# Patient Record
Sex: FEMALE | Race: WHITE | NOT HISPANIC OR LATINO | Employment: OTHER | URBAN - METROPOLITAN AREA
[De-identification: names, ages, dates, MRNs, and addresses within clinical notes are randomized per-mention and may not be internally consistent; named-entity substitution may affect disease eponyms.]

---

## 2021-06-19 ENCOUNTER — HOSPITAL ENCOUNTER (EMERGENCY)
Facility: CLINIC | Age: 36
Discharge: HOME | End: 2021-06-19
Attending: EMERGENCY MEDICINE | Admitting: EMERGENCY MEDICINE
Payer: OTHER GOVERNMENT

## 2021-06-19 VITALS
SYSTOLIC BLOOD PRESSURE: 143 MMHG | DIASTOLIC BLOOD PRESSURE: 85 MMHG | RESPIRATION RATE: 18 BRPM | OXYGEN SATURATION: 99 % | TEMPERATURE: 97.7 F | HEART RATE: 87 BPM

## 2021-06-19 DIAGNOSIS — Z20.822 PERSON UNDER INVESTIGATION FOR COVID-19: ICD-10-CM

## 2021-06-19 DIAGNOSIS — J06.9 UPPER RESPIRATORY TRACT INFECTION, UNSPECIFIED TYPE: ICD-10-CM

## 2021-06-19 DIAGNOSIS — B34.9 VIRAL SYNDROME: ICD-10-CM

## 2021-06-19 LAB
LABORATORY COMMENT REPORT: NORMAL
SARS-COV-2 RNA RESP QL NAA+PROBE: NEGATIVE
SPECIMEN SOURCE: NORMAL

## 2021-06-19 PROCEDURE — C9803 HOPD COVID-19 SPEC COLLECT: HCPCS | Performed by: EMERGENCY MEDICINE

## 2021-06-19 PROCEDURE — 87635 SARS-COV-2 COVID-19 AMP PRB: CPT | Performed by: FAMILY MEDICINE

## 2021-06-19 PROCEDURE — 99284 EMERGENCY DEPT VISIT MOD MDM: CPT | Performed by: EMERGENCY MEDICINE

## 2021-06-19 PROCEDURE — 96372 THER/PROPH/DIAG INJ SC/IM: CPT | Performed by: EMERGENCY MEDICINE

## 2021-06-19 PROCEDURE — 250N000011 HC RX IP 250 OP 636: Performed by: EMERGENCY MEDICINE

## 2021-06-19 RX ORDER — SULFAMETHOXAZOLE/TRIMETHOPRIM 800-160 MG
1 TABLET ORAL 2 TIMES DAILY
Qty: 14 TABLET | Refills: 0 | Status: SHIPPED | OUTPATIENT
Start: 2021-06-19 | End: 2021-06-26

## 2021-06-19 RX ORDER — DEXAMETHASONE SODIUM PHOSPHATE 10 MG/ML
10 INJECTION, SOLUTION INTRAMUSCULAR; INTRAVENOUS ONCE
Status: COMPLETED | OUTPATIENT
Start: 2021-06-19 | End: 2021-06-19

## 2021-06-19 RX ADMIN — DEXAMETHASONE SODIUM PHOSPHATE 10 MG: 10 INJECTION, SOLUTION INTRAMUSCULAR; INTRAVENOUS at 12:16

## 2021-06-19 NOTE — LETTER
June 19, 2021      To Whom It May Concern:      Deana Breaux was seen in our Emergency Department today, 06/19/21.  I expect her condition to improve over the next 1-2 days.  She may return to work when improved.    Sincerely,        Kae Peñaloza MD

## 2021-06-19 NOTE — ED TRIAGE NOTES
Presents to ED with congestion and sinus pressure. Tested negative for COVID Tuesday via saliva. Patient has since lost most of sense of taste.

## 2021-06-19 NOTE — DISCHARGE INSTRUCTIONS
Drink plenty fluids and get lots of rest.    You are given a steroid medication, dexamethasone, that should start to improve your symptoms over the next few hours and last in your system for the next few days.    Continue your over-the-counter medications as needed.    If you are not improving over the next couple of days, start antibiotics as prescribed. They can cause sun sensitivity so please use heavy sunscreen or sun coverage if you start them. I also recommend taking probiotics such as Culturelle or Florastor or eating yogurt or drinking kombucha to replenish your gut bacteria if you start the antibiotics.    Your Covid test was negative!!    Follow-up in clinic if not improving over the week and return for significant worsening, changes or concerns.    I hope you start to feel much better quickly!!

## 2021-06-20 NOTE — ED PROVIDER NOTES
History     Chief Complaint   Patient presents with     Covid Concern     HPI  History per patient    This is a 35-year-old female, basically healthy, presenting with Covid concern.  Patient has been having URI and flulike symptoms for the last 5 days.  She has had body aches, intermittent fevers, nausea, occasional diarrhea, fatigue, body aches.  She has sinus congestion, rhinorrhea, occasional cough.  She has fullness in her right ear.  She has had loss of taste this morning.  Her  has had URI symptoms for over a week and her son was diagnosed with croup about a week ago.  She had a Covid test 2 days ago, saliva test, that was negative.  Her son was tested for Covid last week and was negative.  She has not had a positive Covid test in the past and she is not immunized from Covid.  She does smoke but has not smoked for 3 days.  She has some chronic allergies and takes Singulair and Zyrtec daily.  She has been trying Flonase, NyQuil, DayQuil without relief.  She also had some amoxicillin that she is tried for the last 3 days.  She notes significant fatigue.  She is not diabetic.  No history of lung disease except for sinus infections about twice per year.  She recently moved here from [place].  She notes that her provider there would give her a shot of steroid and she would note improvement.  She currently notes some maxillary sinus tenderness and some inflammation under her right eye.    Allergies:  No Known Allergies    Problem List:    There are no active problems to display for this patient.       Past Medical History:    No past medical history on file.    Past Surgical History:    No past surgical history on file.    Family History:    No family history on file.    Social History:  Marital Status:   [2]  Social History     Tobacco Use     Smoking status: Not on file   Substance Use Topics     Alcohol use: Not on file     Drug use: Not on file        Medications:    No current  facility-administered medications on file prior to encounter.   No current outpatient medications on file prior to encounter.       Review of Systems   All other ROS reviewed and are negative or non-contributory except as stated in HPI.     Physical Exam   BP: (!) 143/85  Pulse: 87  Temp: 97.7  F (36.5  C)  Resp: 18  SpO2: 99 %      Physical Exam  Vitals signs and nursing note reviewed.   Constitutional:       Comments: Patient is sitting on the bed.  She sounds very congested.  Some sneezing, slight cough, rhinorrhea.   HENT:      Head: Normocephalic.      Comments: Tenderness over the right maxillary sinus     Right Ear: Ear canal normal.      Left Ear: Ear canal normal.      Ears:      Comments: Bilateral TM scarring     Nose: Congestion and rhinorrhea present.      Mouth/Throat:      Mouth: Mucous membranes are moist.      Pharynx: Oropharynx is clear. No oropharyngeal exudate or posterior oropharyngeal erythema.   Eyes:      Extraocular Movements: Extraocular movements intact.      Comments: Mild conjunctival injection on the right with some tearing   Neck:      Musculoskeletal: Normal range of motion and neck supple.   Cardiovascular:      Rate and Rhythm: Normal rate and regular rhythm.      Pulses: Normal pulses.      Heart sounds: Normal heart sounds.   Pulmonary:      Effort: Pulmonary effort is normal.      Breath sounds: Normal breath sounds.   Abdominal:      Palpations: Abdomen is soft.      Tenderness: There is no abdominal tenderness.   Musculoskeletal: Normal range of motion.         General: No tenderness.      Right lower leg: No edema.      Left lower leg: No edema.   Lymphadenopathy:      Cervical: No cervical adenopathy.   Skin:     General: Skin is warm and dry.      Findings: No rash.   Neurological:      General: No focal deficit present.      Mental Status: She is alert and oriented to person, place, and time.   Psychiatric:         Mood and Affect: Mood normal.         Behavior: Behavior  "normal.         ED Course (with Medical Decision Making)    Pt seen and examined by me.  RN and EPIC notes reviewed.       Patient with URI symptoms, some sinus tenderness.  She has had loss of taste but had a negative Covid test 2 days ago.  Is possible this is Covid versus other viral illness especially in light of the fact that her  and son have symptoms.  She has had recurrent sinus infections over the years and had numerous tubes for OM in the past.    I am going to check Covid.  I will give her a dose of Decadron, she prefers IM.  I am also going to give her a \"wait-and-see\" Rx for Bactrim.  If she is not improved over the next couple of days she can start this.  We talked about sun sensitivity.  Continue Flonase, over-the-counter medications as needed.  Drink lots of fluids.  Follow-up in clinic if not improving.  Return for significant worsening, changes or concerns.    Of note, patient's Covid test returned negative prior to leaving the ED.        Procedures    Results for orders placed or performed during the hospital encounter of 06/19/21 (from the past 24 hour(s))   Symptomatic SARS-CoV-2 COVID-19 Virus (Coronavirus) by PCR    Specimen: Nasopharyngeal   Result Value Ref Range    SARS-CoV-2 Virus Specimen Source Nasopharyngeal     SARS-CoV-2 PCR Result NEGATIVE     SARS-CoV-2 PCR Comment (Note)        Medications   dexamethasone PF (DECADRON) injection 10 mg (10 mg Intramuscular Given 6/19/21 1216)       Assessments & Plan      I have reviewed the findings, diagnosis, plan and need for follow up with the patient.    There are no discharge medications for this patient.      Final diagnoses:   Upper respiratory tract infection, unspecified type   Viral syndrome   Person under investigation for COVID-19     Disposition: Patient discharged home in stable condition.  Plan as above.  Return for concerns.     Note: Chart documentation done in part with Dragon Voice Recognition software. Although reviewed " after completion, some word and grammatical errors may remain.     6/19/2021   [facility] EMERGENCY DEPT     Kae Peñaloza MD  06/20/21 0001

## 2021-10-15 ENCOUNTER — HOSPITAL ENCOUNTER (EMERGENCY)
Facility: CLINIC | Age: 36
Discharge: HOME | End: 2021-10-15
Attending: PHYSICIAN ASSISTANT | Admitting: PHYSICIAN ASSISTANT
Payer: MEDICAID

## 2021-10-15 VITALS
OXYGEN SATURATION: 99 % | RESPIRATION RATE: 20 BRPM | DIASTOLIC BLOOD PRESSURE: 81 MMHG | WEIGHT: 177 LBS | TEMPERATURE: 98.7 F | SYSTOLIC BLOOD PRESSURE: 140 MMHG | HEART RATE: 75 BPM

## 2021-10-15 DIAGNOSIS — J06.9 VIRAL URI WITH COUGH: ICD-10-CM

## 2021-10-15 PROCEDURE — 99284 EMERGENCY DEPT VISIT MOD MDM: CPT | Performed by: PHYSICIAN ASSISTANT

## 2021-10-15 PROCEDURE — U0005 INFEC AGEN DETEC AMPLI PROBE: HCPCS | Performed by: PHYSICIAN ASSISTANT

## 2021-10-15 PROCEDURE — 99283 EMERGENCY DEPT VISIT LOW MDM: CPT | Performed by: PHYSICIAN ASSISTANT

## 2021-10-15 PROCEDURE — C9803 HOPD COVID-19 SPEC COLLECT: HCPCS | Performed by: PHYSICIAN ASSISTANT

## 2021-10-15 RX ORDER — DEXTROMETHORPHAN POLISTIREX 30 MG/5ML
60 SUSPENSION ORAL 2 TIMES DAILY PRN
Qty: 148 ML | Refills: 0 | Status: SHIPPED | OUTPATIENT
Start: 2021-10-15 | End: 2024-02-19

## 2021-10-15 RX ORDER — BENZONATATE 200 MG/1
200 CAPSULE ORAL 3 TIMES DAILY PRN
Qty: 21 CAPSULE | Refills: 0 | Status: SHIPPED | OUTPATIENT
Start: 2021-10-15 | End: 2024-02-19

## 2021-10-15 RX ORDER — PREDNISONE 20 MG/1
20 TABLET ORAL 2 TIMES DAILY
Qty: 10 TABLET | Refills: 0 | Status: SHIPPED | OUTPATIENT
Start: 2021-10-15 | End: 2021-10-20

## 2021-10-15 RX ORDER — ALBUTEROL SULFATE 90 UG/1
2 AEROSOL, METERED RESPIRATORY (INHALATION) EVERY 4 HOURS PRN
Qty: 18 G | Refills: 0 | Status: SHIPPED | OUTPATIENT
Start: 2021-10-15

## 2021-10-15 NOTE — ED TRIAGE NOTES
Pt presents with concerns of a cough.  Pt states that she started having a cough yesterday with green phlegm.  Body aches and low grade fevers per pt.  Tylenol at 1600.

## 2021-10-16 LAB — SARS-COV-2 RNA RESP QL NAA+PROBE: NEGATIVE

## 2021-10-16 NOTE — DISCHARGE INSTRUCTIONS
"It was a pleasure working with you today!  I hope your condition improves rapidly!     Thankfully, your vital signs and exam were reassuring.  I am concerned that your body is fighting a virus.  We tested you for COVID-19 to ensure that it is not the COVID-19 virus.  I placed some information below regarding COVID-19 in case this turns positive for you.  Your body's immune system will have to fight this off.  Please make sure that you are drinking increased amounts of fluids.  Please rest.  You can take the Tessalon as needed for cough.  Start the prednisone right away to treat the inflammation in your chest.  Use the albuterol inhaler 2 puffs every 4 hours on a regular basis for the next 3-4 days and then as needed.  In the future, please work on quitting smoking.      Discharge Instructions for COVID-19 Patients  You have--or may have--COVID-19. Please follow the instructions listed below.   If you have a weakened immune system, discuss with your doctor any other actions you need to take.  How can I protect others?  If you have symptoms (fever, cough, body aches or trouble breathing):  Stay home and away from others (self-isolate) until:  Your other symptoms have resolved (gotten better). And   You've had no fever--and no medicine that reduces fever--for 1 full day (24 hours). And   At least 10 days have passed since your symptoms started. (You may need to wait 20 days. Follow the advice of your care team.)  If you don't show symptoms, but testing showed that you have COVID-19:  Stay home and away from others (self-isolate) until at least 10 days have passed since the date of your first positive COVID-19 test.  During this time  Stay in your own room, even for meals. Use your own bathroom if you can.  Stay away from others in your home. No hugging, kissing or shaking hands. No visitors.  Don't go to work, school or anywhere else.  Clean \"high touch\" surfaces often (doorknobs, counters, handles). Use household " cleaning spray or wipes.  You'll find a full list of  on the EPA website: www.epa.gov/pesticide-registration/list-n-disinfectants-use-against-sars-cov-2.  Cover your mouth and nose with a mask or other face covering to avoid spreading germs.  Wash your hands and face often. Use soap and water.  Caregivers in these groups are at risk for severe illness due to COVID-19:  People 65 years and older  People who live in a nursing home or long-term care facility  People with chronic disease (lung, heart, cancer, diabetes, kidney, liver, immunologic)  People who have a weakened immune system, including those who:  Are in cancer treatment  Take medicine that weakens the immune system, such as corticosteroids  Had a bone marrow or organ transplant  Have an immune deficiency  Have poorly controlled HIV or AIDS  Are obese (body mass index of 40 or higher)  Smoke regularly  Caregivers should wear gloves while washing dishes, handling laundry and cleaning bedrooms and bathrooms.  Use caution when washing and drying laundry: Don't shake dirty laundry and use the warmest water setting that you can.  For more tips on managing your health at home, go to www.cdc.gov/coronavirus/2019-ncov/downloads/10Things.pdf.  How can I take care of myself at home?  Get lots of rest. Drink extra fluids (unless a doctor has told you not to).  Take Tylenol (acetaminophen) for fever or pain. If you have liver or kidney problems, ask your family doctor if it's okay to take Tylenol.   Adults can take either:   650 mg (two 325 mg pills) every 4 to 6 hours, or   1,000 mg (two 500 mg pills) every 8 hours as needed.  Note: Don't take more than 3,000 mg in one day. Acetaminophen is found in many medicines (both prescribed and over-the-counter medicines). Read all labels to be sure you don't take too much.   For children, check the Tylenol bottle for the right dose. The dose is based on the child's age or weight.  If you have other health problems  (like cancer, heart failure, an organ transplant or severe kidney disease): Call your specialty clinic if you don't feel better in the next 2 days.  Know when to call 911. Emergency warning signs include:  Trouble breathing or shortness of breath  Pain or pressure in the chest that doesn't go away  Feeling confused like you haven't felt before, or not being able to wake up  Bluish-colored lips or face  Your doctor may have prescribed a blood thinner medicine. Follow their instructions.  Where can I get more information?  [facility] - About COVID-19:   [email or web address]  CDC - What to Do If You're Sick: www.cdc.gov/coronavirus/2019-ncov/about/steps-when-sick.html  CDC - Ending Home Isolation: www.cdc.gov/coronavirus/2019-ncov/hcp/disposition-in-home-patients.html  CDC - Caring for Someone: www.cdc.gov/coronavirus/2019-ncov/if-you-are-sick/care-for-someone.html  [facility] - Interim Guidance for Hospital Discharge to Home: www.health.[facility].mn.us/diseases/coronavirus/hcp/hospdischarge.pdf  Below are the COVID-19 hotlines at the [facility] ([facility]). Interpreters are available.  For health questions: Call 995-133-3626 or 1-123.155.3176 (7 a.m. to 7 p.m.)  For questions about schools and childcare: Call 196-819-2664 or 1-149.548.1817 (7 a.m. to 7 p.m.)    For informational purposes only. Not to replace the advice of your health care provider. Clinically reviewed by Dr. Ashish Craig.   Copyright   2020 [employer]. All rights reserved. [employer] [id number] - REV 01/05/21.

## 2021-10-16 NOTE — ED PROVIDER NOTES
History     Chief Complaint   Patient presents with     Cough     HPI  Deana Breaux is a 36 year old female who presents for evaluation of URI symptoms starting yesterday.  Reports a deep cough which is usually dry but occasionally she will produce green mucus.  Also reports generalized aching headache, myalgias, decreased appetite, low-grade fever that she has not measured, and a few episodes of dizziness.  She had an episode of feeling weak yesterday like she was going to experience syncope, but this is improved over the following few minutes.  Reports receiving the COVID-19 vaccination about 2 months ago.  She received Pfizer.  She has been treating her symptoms with DayQuil, NyQuil, Tylenol, and ibuprofen.  No recent travel history.  No other ill family members.  She does work in retail, and has a lot of contact with the public.  She denies prior history of asthma.  She is a 1/2-1 pack/day smoker.  Has albuterol at home, but states that it is a very old prescription.  She denies ever being formally diagnosed with COPD.        Allergies:  No Known Allergies    Problem List:    There are no problems to display for this patient.       Past Medical History:    No past medical history on file.    Past Surgical History:    No past surgical history on file.    Family History:    No family history on file.    Social History:  Marital Status:   [2]  Social History     Tobacco Use     Smoking status: Not on file   Substance Use Topics     Alcohol use: Not on file     Drug use: Not on file        Medications:    albuterol (PROAIR HFA/PROVENTIL HFA/VENTOLIN HFA) 108 (90 Base) MCG/ACT inhaler  benzonatate (TESSALON) 200 MG capsule  dextromethorphan (DELSYM) 30 MG/5ML liquid  predniSONE (DELTASONE) 20 MG tablet          Review of Systems   All other systems reviewed and are negative.      Physical Exam   BP: (!) 140/81  Pulse: 75  Temp: 98.7  F (37.1  C)  Resp: 20  Weight: 80.3 kg (177 lb)  SpO2: 99  %      Physical Exam  Vitals and nursing note reviewed.   Constitutional:       General: She is not in acute distress.     Appearance: She is not diaphoretic.   HENT:      Head: Normocephalic and atraumatic.      Right Ear: Tympanic membrane, ear canal and external ear normal.      Left Ear: Tympanic membrane, ear canal and external ear normal.      Nose: Nose normal. No congestion or rhinorrhea.      Mouth/Throat:      Mouth: Mucous membranes are moist.      Pharynx: No oropharyngeal exudate.   Eyes:      General: No scleral icterus.        Right eye: No discharge.         Left eye: No discharge.      Conjunctiva/sclera: Conjunctivae normal.      Pupils: Pupils are equal, round, and reactive to light.   Neck:      Thyroid: No thyromegaly.   Cardiovascular:      Rate and Rhythm: Normal rate and regular rhythm.      Heart sounds: Normal heart sounds. No murmur heard.     Pulmonary:      Effort: Pulmonary effort is normal. No respiratory distress.      Breath sounds: Normal breath sounds. No wheezing, rhonchi or rales.   Chest:      Chest wall: No tenderness.   Abdominal:      General: Bowel sounds are normal. There is no distension.      Palpations: Abdomen is soft. There is no mass.      Tenderness: There is no abdominal tenderness. There is no right CVA tenderness, left CVA tenderness, guarding or rebound.   Musculoskeletal:         General: No tenderness or deformity. Normal range of motion.      Cervical back: Normal range of motion and neck supple.   Lymphadenopathy:      Cervical: No cervical adenopathy.   Skin:     General: Skin is warm and dry.      Capillary Refill: Capillary refill takes less than 2 seconds.      Coloration: Skin is not pale.      Findings: No bruising, erythema, lesion or rash.   Neurological:      General: No focal deficit present.      Mental Status: She is alert and oriented to person, place, and time.      Cranial Nerves: No cranial nerve deficit.   Psychiatric:         Mood and  Affect: Mood normal.         Behavior: Behavior normal.         Thought Content: Thought content normal.         ED Course        Procedures              Critical Care time:  none               No results found for this or any previous visit (from the past 24 hour(s)).    Medications - No data to display    Assessments & Plan (with Medical Decision Making)  Viral URI with cough     36 year old female presents for evaluation of URI symptoms for the past 2 days including cough, headache, myalgias, decreased appetite, low-grade fever, and occasional episodes of dizziness.  No change in taste/smell sensation.  She did receive the COVID-19 vaccination.  1/2-1 pack/day smoker.  See HPI for details.  On exam blood pressure 140/81, temperature 98.7, pulse 75, respiration 20, oxygen saturation 99% on room air.  Patient is no acute distress.  Exam is completely reassuring.  See above.  Discussed with the patient that she is likely battling a viral illness.  She was screened for COVID-19 in case that she does have a breakthrough case despite her vaccination status.  Discussed quarantine until results return.  Push clear fluids and increase rest.  Given her long-term smoking history, she could have underlying COPD.  She states that her cough is quite tight.  Therefore, we will boost her with prednisone 20 mg twice daily for 5 days.  She reports that this is helped her in the past.  I also refilled her albuterol to take 2 puffs every 4 hours on a regular basis for the next 3-4 days and then as needed thereafter worse.  Tessalon Perles prescribed as well.  Pharmacy called back and said that Tessalon Perles is not covered under insurance.  Therefore, I prescribed Delsym.  Indications for return reviewed with the patient.  In case she does have COVID-19, I did provide specific COVID-19 discharge instructions.  Patient was in agreement with this plan and was suitable for discharge.     I have reviewed the nursing notes.    I have  reviewed the findings, diagnosis, plan and need for follow up with the patient.       Discharge Medication List as of 10/15/2021  8:07 PM      START taking these medications    Details   albuterol (PROAIR HFA/PROVENTIL HFA/VENTOLIN HFA) 108 (90 Base) MCG/ACT inhaler Inhale 2 puffs into the lungs every 4 hours as needed for shortness of breath / dyspnea, Disp-18 g, R-0, E-PrescribePharmacy may dispense brand covered by insurance (Proair, or proventil or ventolin or generic albuterol inhaler)      benzonatate (TESSALON) 200 MG capsule Take 1 capsule (200 mg) by mouth 3 times daily as needed for cough, Disp-21 capsule, R-0, E-Prescribe      predniSONE (DELTASONE) 20 MG tablet Take 1 tablet (20 mg) by mouth 2 times daily for 5 days, Disp-10 tablet, R-0, E-Prescribe             Final diagnoses:   Viral URI with cough     Disclaimer: This note consists of symbols derived from keyboarding, dictation and/or voice recognition software. As a result, there may be errors in the script that have gone undetected. Please consider this when interpreting information found in this chart.      10/15/2021   [facility] EMERGENCY DEPT     Salty Lott PA-C  10/15/21 2338

## 2022-11-15 ENCOUNTER — HOSPITAL ENCOUNTER (EMERGENCY)
Facility: CLINIC | Age: 37
Discharge: HOME | End: 2022-11-15
Attending: EMERGENCY MEDICINE | Admitting: EMERGENCY MEDICINE
Payer: COMMERCIAL

## 2022-11-15 VITALS
OXYGEN SATURATION: 99 % | RESPIRATION RATE: 18 BRPM | SYSTOLIC BLOOD PRESSURE: 118 MMHG | HEART RATE: 100 BPM | TEMPERATURE: 97.8 F | DIASTOLIC BLOOD PRESSURE: 82 MMHG

## 2022-11-15 DIAGNOSIS — J06.9 UPPER RESPIRATORY TRACT INFECTION, UNSPECIFIED TYPE: ICD-10-CM

## 2022-11-15 LAB
FLUAV RNA SPEC QL NAA+PROBE: NEGATIVE
FLUBV RNA RESP QL NAA+PROBE: NEGATIVE
RSV RNA SPEC NAA+PROBE: NEGATIVE
SARS-COV-2 RNA RESP QL NAA+PROBE: NEGATIVE

## 2022-11-15 PROCEDURE — 99284 EMERGENCY DEPT VISIT MOD MDM: CPT | Mod: CS | Performed by: EMERGENCY MEDICINE

## 2022-11-15 PROCEDURE — 250N000011 HC RX IP 250 OP 636: Performed by: EMERGENCY MEDICINE

## 2022-11-15 PROCEDURE — C9803 HOPD COVID-19 SPEC COLLECT: HCPCS | Performed by: EMERGENCY MEDICINE

## 2022-11-15 PROCEDURE — 87637 SARSCOV2&INF A&B&RSV AMP PRB: CPT | Performed by: FAMILY MEDICINE

## 2022-11-15 PROCEDURE — 96372 THER/PROPH/DIAG INJ SC/IM: CPT | Performed by: EMERGENCY MEDICINE

## 2022-11-15 RX ORDER — FERROUS SULFATE 325(65) MG
325 TABLET, DELAYED RELEASE (ENTERIC COATED) ORAL
COMMUNITY
Start: 2022-07-22 | End: 2023-07-22

## 2022-11-15 RX ORDER — DEXTROAMPHETAMINE SACCHARATE, AMPHETAMINE ASPARTATE, DEXTROAMPHETAMINE SULFATE AND AMPHETAMINE SULFATE 5; 5; 5; 5 MG/1; MG/1; MG/1; MG/1
TABLET ORAL
COMMUNITY
Start: 2022-08-24

## 2022-11-15 RX ORDER — DEXAMETHASONE SODIUM PHOSPHATE 10 MG/ML
10 INJECTION, SOLUTION INTRAMUSCULAR; INTRAVENOUS ONCE
Status: COMPLETED | OUTPATIENT
Start: 2022-11-15 | End: 2022-11-15

## 2022-11-15 RX ORDER — IBUPROFEN 200 MG
800 TABLET ORAL EVERY 8 HOURS PRN
COMMUNITY

## 2022-11-15 RX ADMIN — DEXAMETHASONE SODIUM PHOSPHATE 10 MG: 10 INJECTION, SOLUTION INTRAMUSCULAR; INTRAVENOUS at 18:10

## 2022-11-15 NOTE — ED PROVIDER NOTES
History     Chief Complaint   Patient presents with     Cough     HPI  Deana Breaux is a 37 year old female who presents to the emergency department secondary to upper respiratory infection symptoms.  This started a week and a half ago and has persisted.  No fevers.  She has had some headache and body aches.  The cough is nonproductive.  She is not really short of breath.  She tried an albuterol inhaler which did not help.  No history of asthma or COPD but has had bronchitis in the past.  No vomiting but occasionally has some pain in her abdomen.    Allergies:  No Known Allergies    Problem List:    There are no problems to display for this patient.       Past Medical History:    History reviewed. No pertinent past medical history.    Past Surgical History:    History reviewed. No pertinent surgical history.    Family History:    History reviewed. No pertinent family history.    Social History:  Marital Status:   [2]  Social History     Tobacco Use     Smoking status: Every Day     Types: Cigarettes, Vaping Device     Smokeless tobacco: Never   Substance Use Topics     Alcohol use: Not Currently     Drug use: Never        Medications:    albuterol (PROAIR HFA/PROVENTIL HFA/VENTOLIN HFA) 108 (90 Base) MCG/ACT inhaler  amphetamine-dextroamphetamine (ADDERALL) 20 MG tablet  ferrous sulfate (FE TABS) 325 (65 Fe) MG EC tablet  ibuprofen (ADVIL/MOTRIN) 200 MG tablet  benzonatate (TESSALON) 200 MG capsule  dextromethorphan (DELSYM) 30 MG/5ML liquid          Review of Systems   All other systems reviewed and are negative.      Physical Exam   BP: 118/82  Pulse: 100  Temp: 97.8  F (36.6  C)  Resp: 18  SpO2: 99 %      Physical Exam  Vitals and nursing note reviewed.   Constitutional:       General: She is not in acute distress.     Appearance: Normal appearance. She is well-developed and well-nourished. She is not diaphoretic.   HENT:      Head: Normocephalic and atraumatic.      Right Ear: External ear normal.       Left Ear: External ear normal.      Nose: Congestion present. No rhinorrhea.      Mouth/Throat:      Mouth: Mucous membranes are moist.      Pharynx: No oropharyngeal exudate or posterior oropharyngeal erythema.   Eyes:      General: No scleral icterus.     Extraocular Movements: Extraocular movements intact.      Pupils: Pupils are equal, round, and reactive to light.   Cardiovascular:      Rate and Rhythm: Normal rate.   Pulmonary:      Effort: Pulmonary effort is normal.      Breath sounds: Normal breath sounds. No rales.   Chest:      Chest wall: No tenderness.   Abdominal:      Tenderness: There is no abdominal tenderness. There is no guarding or rebound.   Musculoskeletal:         General: Normal range of motion.      Cervical back: Normal range of motion and neck supple.   Skin:     General: Skin is warm and dry.      Coloration: Skin is not pale.      Findings: No erythema or rash.   Neurological:      General: No focal deficit present.      Mental Status: She is alert and oriented to person, place, and time.   Psychiatric:         Mood and Affect: Mood normal.         Thought Content: Thought content normal.         ED Course                 Procedures                  Results for orders placed or performed during the hospital encounter of 11/15/22 (from the past 24 hour(s))   Symptomatic; Auto-generated order Influenza A/B & SARS-CoV2 (COVID-19) Virus PCR Multiplex Nasopharyngeal    Specimen: Nasopharyngeal; Swab   Result Value Ref Range    Influenza A PCR Negative Negative    Influenza B PCR Negative Negative    RSV PCR Negative Negative    SARS CoV2 PCR Negative Negative    Narrative    Testing was performed using the Xpert Xpress CoV2/Flu/RSV Assay on the [employer] GeneXpert Instrument. This test should be ordered for the detection of SARS-CoV-2 and influenza viruses in individuals who meet clinical and/or epidemiological criteria. Test performance is unknown in asymptomatic patients. This test is  for in vitro diagnostic use under the FDA EUA for laboratories certified under CLIA to perform high or moderate complexity testing. This test has not been FDA cleared or approved. A negative result does not rule out the presence of PCR inhibitors in the specimen or target RNA in concentration below the limit of detection for the assay. If only one viral target is positive but coinfection with multiple targets is suspected, the sample should be re-tested with another FDA cleared, approved, or authorized test, if coinfection would change clinical management. This test was validated by the [facility]. These laboratories are certified under the Clinical Laboratory Improvement Amendments of 1988 (CLIA-88) as qualified to perform high complexity laboratory testing.       Medications   dexamethasone PF (DECADRON) injection 10 mg (has no administration in time range)       Assessments & Plan (with Medical Decision Making)  37-year-old female with upper respite tract infection symptoms and a history of bronchitis.  Vital signs are stable.  Oxygen saturation is normal.  She is in no distress.  No fever.  Influenza COVID and RSV were all negative.  Most likely this is a viral upper respiratory tract infection versus bronchitis.  She wanted to be treated with a shot of steroids and she was given 10 mg of Decadron IM.  This should help with the cough and congestion.  Return to ER precautions and fall precautions discussed.  All questions answered prior to discharge.     I have reviewed the nursing notes.    I have reviewed the findings, diagnosis, plan and need for follow up with the patient.      New Prescriptions    No medications on file       Final diagnoses:   Upper respiratory tract infection, unspecified type       11/15/2022   [facility] EMERGENCY DEPT     Carrillo Root MD  11/15/22 1959

## 2022-11-15 NOTE — ED TRIAGE NOTES
Coughing for over a week, low grade fevers. Hx of bronchitis per pt frequently.      Triage Assessment     Row Name 11/15/22 6127       Triage Assessment (Adult)    Airway WDL WDL       Respiratory WDL    Respiratory WDL X       Cardiac WDL    Cardiac WDL WDL

## 2022-11-15 NOTE — DISCHARGE INSTRUCTIONS
Hopefully the steroid medicine helps your cough.  Your COVID influenza and RSV swabs are negative.  This most likely is a virus.  If you start having fever or increasing shortness of breath return to the emergency department.  You can use your albuterol inhaler as needed.

## 2023-10-17 ENCOUNTER — TRANSCRIBE ORDERS (OUTPATIENT)
Dept: OTHER | Age: 38
End: 2023-10-17

## 2023-10-17 DIAGNOSIS — H65.23 BILATERAL CHRONIC SEROUS OTITIS MEDIA: Primary | ICD-10-CM

## 2024-02-06 NOTE — PROGRESS NOTES
ENT Consultation    Deana Breaux who is a 38 year old female seen in consultation at the request of Lo Zaldivar CNP.      History of Present Illness - Deana Breaux is a 38 year old female presents with chief complaint of recurrent ear infections.  Started about a year ago.  She has had at least 3-4.  Last double ear infection was about 6 weeks ago.  Does not feel that the hearing has been affected.  When she was younger she had tubes 3-4 times but has not had infections since childhood until a year ago.  Denies any history of eczema or psoriasis.  Denies any history of allergies.      BP Readings from Last 1 Encounters:   02/19/24 120/76       BP noted to be well controlled today in office.     Deana IS NOT a smoker/uses chewing tobacco.  Deana already quit      Past Medical History - No past medical history on file.    Current Medications -   Current Outpatient Medications:     amphetamine-dextroamphetamine (ADDERALL) 20 MG tablet, TAKE ONE TABLET BY MOUTH TWICE A DAY AT 7-8 IN THE MORNING AND 2ND DOSE AT 1-2:00 IN THE AFTERNOON 10-23-22, Disp: , Rfl:     ibuprofen (ADVIL/MOTRIN) 200 MG tablet, Take 800 mg by mouth every 8 hours as needed for pain, Disp: , Rfl:     albuterol (PROAIR HFA/PROVENTIL HFA/VENTOLIN HFA) 108 (90 Base) MCG/ACT inhaler, Inhale 2 puffs into the lungs every 4 hours as needed for shortness of breath / dyspnea (Patient not taking: Reported on 2/19/2024), Disp: 18 g, Rfl: 0    Allergies - No Known Allergies    Social History -   Social History     Socioeconomic History    Marital status:    Tobacco Use    Smoking status: Every Day     Types: Cigarettes, Vaping Device    Smokeless tobacco: Never   Substance and Sexual Activity    Alcohol use: Not Currently    Drug use: Never       Family History - History reviewed. No pertinent family history.    Review of Systems - As per HPI and PMHx, otherwise review of system review of the head and neck negative. Otherwise 10+ review of  "system is negative    Physical Exam  /76 (BP Location: Right arm, Patient Position: Sitting, Cuff Size: Adult Regular)   Temp 97.4  F (36.3  C) (Temporal)   Ht 1.626 m (5' 4\")   Wt 78.4 kg (172 lb 12.8 oz)   LMP 02/02/2024 (Exact Date)   BMI 29.66 kg/m    BMI: Body mass index is 29.66 kg/m .    General - The patient is well nourished and well developed, and appears to have good nutritional status.  Alert and oriented to person and place, answers questions and cooperates with examination appropriately.    SKIN - No suspicious lesions or rashes.  Respiration - No respiratory distress.  Head and Face - Normocephalic and atraumatic, with no gross asymmetry noted of the contour of the facial features.  The facial nerve is intact, with strong symmetric movements.    Voice and Breathing - The patient was breathing comfortably without the use of accessory muscles. The patients voice was clear and strong, and had appropriate pitch and quality.    Ears - Bilateral pinna and EACs with normal appearing overlying skin. Tympanic membrane intact with good mobility on pneumatic otoscopy bilaterally. Bony landmarks of the ossicular chain are normal. The tympanic membranes are normal in appearance. No retraction, perforation, or masses.  No fluid or purulence was seen in the external canal or the middle ear.   The only finding was myringosclerosis bilaterally involving both drums.  Eyes - Extraocular movements intact.  Sclera were not icteric or injected, conjunctiva were pink and moist.    Mouth - Examination of the oral cavity showed pink, healthy oral mucosa. No lesions or ulcerations noted.  The tongue was mobile and midline, and the dentition showed patient be edentulous wearing upper and lower full dentures.  Throat - The walls of the oropharynx were smooth, pink, moist, symmetric, and had no lesions or ulcerations.  The tonsillar pillars and soft palate were symmetric.  The uvula was midline on elevation.    Neck - " Normal midline excursion of the laryngotracheal complex during swallowing.  Full range of motion on passive movement.  Palpation of the occipital, submental, submandibular, internal jugular chain, and supraclavicular nodes did not demonstrate any abnormal lymph nodes or masses.  The carotid pulse was palpable bilaterally.  Palpation of the thyroid was soft and smooth, with no nodules or goiter appreciated.  The trachea was mobile and midline.    Nose - External contour is symmetric, no gross deflection or scars.  Nasal mucosa is pink and moist with no abnormal mucus.  The septum was midline and non-obstructive, turbinates of normal size and position.  No polyps, masses, or purulence noted on examination.    Neuro - Nonfocal neuro exam is normal, CN 2 through 12 intact, normal gait and muscle tone.      Performed in clinic today:  Audiologic Studies - An audiogram and tympanogram were performed today as part of the evaluation and personally reviewed. The tympanogram shows normal Type A curves, with normal canal volumes and middle ear pressures.  There is no sign of eustachian tube dysfunction or middle ear effusion.  The audiogram was also normal.  The sensorineural hearing was age-appropriate, with no evidence of conductive hearing loss or significant asymmetry.  Minimal high-frequency sensorineural loss bilaterally.  Word recognition score 100% bilaterally.    A/P - Deana HOA Breaux is a 38 year old female with apparent history of ear infections but no current evidence of ear issues other than some myringosclerosis.  We discussed with the patient potential eustachian tube dysfunction using nasal saline as well as doing eustachian tube exercises.  She will follow-up as needed.      Jose Pederson MD

## 2024-02-19 ENCOUNTER — OFFICE VISIT (OUTPATIENT)
Dept: AUDIOLOGY | Facility: CLINIC | Age: 39
End: 2024-02-19
Attending: OTOLARYNGOLOGY
Payer: COMMERCIAL

## 2024-02-19 ENCOUNTER — OFFICE VISIT (OUTPATIENT)
Dept: OTOLARYNGOLOGY | Facility: CLINIC | Age: 39
End: 2024-02-19
Attending: OTOLARYNGOLOGY
Payer: COMMERCIAL

## 2024-02-19 VITALS
TEMPERATURE: 97.4 F | SYSTOLIC BLOOD PRESSURE: 120 MMHG | DIASTOLIC BLOOD PRESSURE: 76 MMHG | BODY MASS INDEX: 29.5 KG/M2 | WEIGHT: 172.8 LBS | HEIGHT: 64 IN

## 2024-02-19 DIAGNOSIS — H69.93 DYSFUNCTION OF BOTH EUSTACHIAN TUBES: Primary | ICD-10-CM

## 2024-02-19 DIAGNOSIS — H65.06 RECURRENT ACUTE SEROUS OTITIS MEDIA OF BOTH EARS: Primary | ICD-10-CM

## 2024-02-19 PROCEDURE — 99242 OFF/OP CONSLTJ NEW/EST SF 20: CPT | Performed by: OTOLARYNGOLOGY

## 2024-02-19 PROCEDURE — 92567 TYMPANOMETRY: CPT | Performed by: AUDIOLOGIST

## 2024-02-19 PROCEDURE — 92557 COMPREHENSIVE HEARING TEST: CPT | Performed by: AUDIOLOGIST

## 2024-02-19 ASSESSMENT — PAIN SCALES - GENERAL: PAINLEVEL: MODERATE PAIN (4)

## 2024-02-19 NOTE — LETTER
2/19/2024         RE: Deana Breaux  [address]  [place] [address]        Dear Colleague,    Thank you for referring your patient, Deana Breaux, to the [facility]. Please see a copy of my visit note below.    ENT Consultation    Deana Breaux who is a 38 year old female seen in consultation at the request of Lo Zaldivar CNP.      History of Present Illness - Deana Breaux is a 38 year old female presents with chief complaint of recurrent ear infections.  Started about a year ago.  She has had at least 3-4.  Last double ear infection was about 6 weeks ago.  Does not feel that the hearing has been affected.  When she was younger she had tubes 3-4 times but has not had infections since childhood until a year ago.  Denies any history of eczema or psoriasis.  Denies any history of allergies.      BP Readings from Last 1 Encounters:   02/19/24 120/76       BP noted to be well controlled today in office.     Deana IS NOT a smoker/uses chewing tobacco.  Deana already quit      Past Medical History - No past medical history on file.    Current Medications -   Current Outpatient Medications:      amphetamine-dextroamphetamine (ADDERALL) 20 MG tablet, TAKE ONE TABLET BY MOUTH TWICE A DAY AT 7-8 IN THE MORNING AND 2ND DOSE AT 1-2:00 IN THE AFTERNOON 10-23-22, Disp: , Rfl:      ibuprofen (ADVIL/MOTRIN) 200 MG tablet, Take 800 mg by mouth every 8 hours as needed for pain, Disp: , Rfl:      albuterol (PROAIR HFA/PROVENTIL HFA/VENTOLIN HFA) 108 (90 Base) MCG/ACT inhaler, Inhale 2 puffs into the lungs every 4 hours as needed for shortness of breath / dyspnea (Patient not taking: Reported on 2/19/2024), Disp: 18 g, Rfl: 0    Allergies - No Known Allergies    Social History -   Social History     Socioeconomic History     Marital status:    Tobacco Use     Smoking status: Every Day     Types: Cigarettes, Vaping Device     Smokeless tobacco: Never   Substance and Sexual Activity  "    Alcohol use: Not Currently     Drug use: Never       Family History - History reviewed. No pertinent family history.    Review of Systems - As per HPI and PMHx, otherwise review of system review of the head and neck negative. Otherwise 10+ review of system is negative    Physical Exam  /76 (BP Location: Right arm, Patient Position: Sitting, Cuff Size: Adult Regular)   Temp 97.4  F (36.3  C) (Temporal)   Ht 1.626 m (5' 4\")   Wt 78.4 kg (172 lb 12.8 oz)   LMP 02/02/2024 (Exact Date)   BMI 29.66 kg/m    BMI: Body mass index is 29.66 kg/m .    General - The patient is well nourished and well developed, and appears to have good nutritional status.  Alert and oriented to person and place, answers questions and cooperates with examination appropriately.    SKIN - No suspicious lesions or rashes.  Respiration - No respiratory distress.  Head and Face - Normocephalic and atraumatic, with no gross asymmetry noted of the contour of the facial features.  The facial nerve is intact, with strong symmetric movements.    Voice and Breathing - The patient was breathing comfortably without the use of accessory muscles. The patients voice was clear and strong, and had appropriate pitch and quality.    Ears - Bilateral pinna and EACs with normal appearing overlying skin. Tympanic membrane intact with good mobility on pneumatic otoscopy bilaterally. Bony landmarks of the ossicular chain are normal. The tympanic membranes are normal in appearance. No retraction, perforation, or masses.  No fluid or purulence was seen in the external canal or the middle ear.   The only finding was myringosclerosis bilaterally involving both drums.  Eyes - Extraocular movements intact.  Sclera were not icteric or injected, conjunctiva were pink and moist.    Mouth - Examination of the oral cavity showed pink, healthy oral mucosa. No lesions or ulcerations noted.  The tongue was mobile and midline, and the dentition showed patient be " edentulous wearing upper and lower full dentures.  Throat - The walls of the oropharynx were smooth, pink, moist, symmetric, and had no lesions or ulcerations.  The tonsillar pillars and soft palate were symmetric.  The uvula was midline on elevation.    Neck - Normal midline excursion of the laryngotracheal complex during swallowing.  Full range of motion on passive movement.  Palpation of the occipital, submental, submandibular, internal jugular chain, and supraclavicular nodes did not demonstrate any abnormal lymph nodes or masses.  The carotid pulse was palpable bilaterally.  Palpation of the thyroid was soft and smooth, with no nodules or goiter appreciated.  The trachea was mobile and midline.    Nose - External contour is symmetric, no gross deflection or scars.  Nasal mucosa is pink and moist with no abnormal mucus.  The septum was midline and non-obstructive, turbinates of normal size and position.  No polyps, masses, or purulence noted on examination.    Neuro - Nonfocal neuro exam is normal, CN 2 through 12 intact, normal gait and muscle tone.      Performed in clinic today:  Audiologic Studies - An audiogram and tympanogram were performed today as part of the evaluation and personally reviewed. The tympanogram shows normal Type A curves, with normal canal volumes and middle ear pressures.  There is no sign of eustachian tube dysfunction or middle ear effusion.  The audiogram was also normal.  The sensorineural hearing was age-appropriate, with no evidence of conductive hearing loss or significant asymmetry.  Minimal high-frequency sensorineural loss bilaterally.  Word recognition score 100% bilaterally.    A/P - Deana Breaux is a 38 year old female with apparent history of ear infections but no current evidence of ear issues other than some myringosclerosis.  We discussed with the patient potential eustachian tube dysfunction using nasal saline as well as doing eustachian tube exercises.  She will  follow-up as needed.      Jose Pederson MD       Again, thank you for allowing me to participate in the care of your patient.        Sincerely,        Jose Pederson MD, MD

## 2024-02-19 NOTE — PROGRESS NOTES
AUDIOLOGY REPORT     SUMMARY: Audiology visit completed. See audiogram for results.     RECOMMENDATIONS: Follow-up with ENT    Darcy Zamudio Licensed Audiologist #[id number]

## 2024-10-05 ENCOUNTER — HEALTH MAINTENANCE LETTER (OUTPATIENT)
Age: 39
End: 2024-10-05